# Patient Record
(demographics unavailable — no encounter records)

---

## 2017-12-01 PROBLEM — O46.93 VAGINAL BLEEDING IN PREGNANCY, THIRD TRIMESTER: Status: ACTIVE | Noted: 2017-12-01

## 2019-08-06 RX ORDER — TRAZODONE HYDROCHLORIDE 150 MG/1
150 TABLET ORAL NIGHTLY
Qty: 30 TABLET | Refills: 3 | Status: ON HOLD | OUTPATIENT
Start: 2019-08-06 | End: 2021-11-11 | Stop reason: HOSPADM

## 2019-08-06 NOTE — TELEPHONE ENCOUNTER
Called and left message on recorder that medication has been refilled and that patient needed an appointment for further refills.

## 2019-08-06 NOTE — TELEPHONE ENCOUNTER
Received fax refill request on the pended medication, patient has not yet been seen here in the [facility] in [place], Please advise.

## 2019-08-07 NOTE — TELEPHONE ENCOUNTER
Called and spoke with the sonu who states that she will schedule an appointment when she needs too.

## 2021-11-11 PROBLEM — Z34.90 ENCOUNTER FOR ELECTIVE INDUCTION OF LABOR: Status: ACTIVE | Noted: 2021-11-11

## 2021-11-11 PROBLEM — Z34.90 TERM PREGNANCY: Status: ACTIVE | Noted: 2021-11-11

## 2023-12-27 PROBLEM — N93.0 POSTCOITAL BLEEDING: Status: ACTIVE | Noted: 2023-12-27
